# Patient Record
Sex: FEMALE | Race: WHITE
[De-identification: names, ages, dates, MRNs, and addresses within clinical notes are randomized per-mention and may not be internally consistent; named-entity substitution may affect disease eponyms.]

---

## 2019-02-18 ENCOUNTER — HOSPITAL ENCOUNTER (OUTPATIENT)
Dept: HOSPITAL 25 - ED | Age: 44
Setting detail: OBSERVATION
LOS: 1 days | Discharge: HOME | End: 2019-02-19
Attending: INTERNAL MEDICINE | Admitting: HOSPITALIST
Payer: COMMERCIAL

## 2019-02-18 ENCOUNTER — HOSPITAL ENCOUNTER (EMERGENCY)
Dept: HOSPITAL 25 - UCEAST | Age: 44
Discharge: ANOTHER HOSPITAL | End: 2019-02-18
Payer: COMMERCIAL

## 2019-02-18 VITALS — DIASTOLIC BLOOD PRESSURE: 83 MMHG | SYSTOLIC BLOOD PRESSURE: 107 MMHG

## 2019-02-18 DIAGNOSIS — R00.2: ICD-10-CM

## 2019-02-18 DIAGNOSIS — Z82.49: ICD-10-CM

## 2019-02-18 DIAGNOSIS — I47.1: Primary | ICD-10-CM

## 2019-02-18 DIAGNOSIS — Z87.891: ICD-10-CM

## 2019-02-18 DIAGNOSIS — R07.9: ICD-10-CM

## 2019-02-18 DIAGNOSIS — R79.89: ICD-10-CM

## 2019-02-18 DIAGNOSIS — Z79.82: ICD-10-CM

## 2019-02-18 LAB
ALBUMIN SERPL BCG-MCNC: 4.1 G/DL (ref 3.2–5.2)
ALBUMIN/GLOB SERPL: 1.2 {RATIO} (ref 1–3)
ALP SERPL-CCNC: 72 U/L (ref 34–104)
ALT SERPL W P-5'-P-CCNC: 14 U/L (ref 7–52)
ANION GAP SERPL CALC-SCNC: 3 MMOL/L (ref 2–11)
APTT PPP: 35.4 SECONDS (ref 26–36.3)
AST SERPL-CCNC: 18 U/L (ref 13–39)
BASOPHILS # BLD AUTO: 0.1 10^3/UL (ref 0–0.2)
BUN SERPL-MCNC: 10 MG/DL (ref 6–24)
BUN/CREAT SERPL: 12.8 (ref 8–20)
CALCIUM SERPL-MCNC: 8.6 MG/DL (ref 8.6–10.3)
CHLORIDE SERPL-SCNC: 109 MMOL/L (ref 101–111)
CK MB SERPL-MCNC: 2.6 NG/ML (ref 0.6–6.3)
EOSINOPHIL # BLD AUTO: 0.2 10^3/UL (ref 0–0.6)
GLOBULIN SER CALC-MCNC: 3.3 G/DL (ref 2–4)
GLUCOSE SERPL-MCNC: 85 MG/DL (ref 70–100)
HCO3 SERPL-SCNC: 29 MMOL/L (ref 22–32)
HCT VFR BLD AUTO: 44 % (ref 35–47)
HGB BLD-MCNC: 14.9 G/DL (ref 12–16)
INR PPP/BLD: 0.86 (ref 0.77–1.02)
LYMPHOCYTES # BLD AUTO: 3.3 10^3/UL (ref 1–4.8)
MAGNESIUM SERPL-MCNC: 2 MG/DL (ref 1.9–2.7)
MCH RBC QN AUTO: 32 PG (ref 27–31)
MCHC RBC AUTO-ENTMCNC: 34 G/DL (ref 31–36)
MCV RBC AUTO: 93 FL (ref 80–97)
MONOCYTES # BLD AUTO: 0.8 10^3/UL (ref 0–0.8)
NEUTROPHILS # BLD AUTO: 6.1 10^3/UL (ref 1.5–7.7)
NRBC # BLD AUTO: 0 10^3/UL
NRBC BLD QL AUTO: 0
PLATELET # BLD AUTO: 315 10^3/UL (ref 150–450)
POTASSIUM SERPL-SCNC: 3.8 MMOL/L (ref 3.5–5)
PROT SERPL-MCNC: 7.4 G/DL (ref 6.4–8.9)
RBC # BLD AUTO: 4.72 10^6/UL (ref 4–5.4)
SODIUM SERPL-SCNC: 141 MMOL/L (ref 135–145)
TROPONIN I SERPL-MCNC: 0.07 NG/ML (ref ?–0.04)
WBC # BLD AUTO: 10.6 10^3/UL (ref 3.5–10.8)

## 2019-02-18 PROCEDURE — 85025 COMPLETE CBC W/AUTO DIFF WBC: CPT

## 2019-02-18 PROCEDURE — 80061 LIPID PANEL: CPT

## 2019-02-18 PROCEDURE — 99212 OFFICE O/P EST SF 10 MIN: CPT

## 2019-02-18 PROCEDURE — 96372 THER/PROPH/DIAG INJ SC/IM: CPT

## 2019-02-18 PROCEDURE — 96361 HYDRATE IV INFUSION ADD-ON: CPT

## 2019-02-18 PROCEDURE — 96375 TX/PRO/DX INJ NEW DRUG ADDON: CPT

## 2019-02-18 PROCEDURE — G0463 HOSPITAL OUTPT CLINIC VISIT: HCPCS

## 2019-02-18 PROCEDURE — 93306 TTE W/DOPPLER COMPLETE: CPT

## 2019-02-18 PROCEDURE — 99284 EMERGENCY DEPT VISIT MOD MDM: CPT

## 2019-02-18 PROCEDURE — 84484 ASSAY OF TROPONIN QUANT: CPT

## 2019-02-18 PROCEDURE — 71046 X-RAY EXAM CHEST 2 VIEWS: CPT

## 2019-02-18 PROCEDURE — 82553 CREATINE MB FRACTION: CPT

## 2019-02-18 PROCEDURE — 36415 COLL VENOUS BLD VENIPUNCTURE: CPT

## 2019-02-18 PROCEDURE — 78452 HT MUSCLE IMAGE SPECT MULT: CPT

## 2019-02-18 PROCEDURE — 85730 THROMBOPLASTIN TIME PARTIAL: CPT

## 2019-02-18 PROCEDURE — G0378 HOSPITAL OBSERVATION PER HR: HCPCS

## 2019-02-18 PROCEDURE — 93005 ELECTROCARDIOGRAM TRACING: CPT

## 2019-02-18 PROCEDURE — 83735 ASSAY OF MAGNESIUM: CPT

## 2019-02-18 PROCEDURE — 85610 PROTHROMBIN TIME: CPT

## 2019-02-18 PROCEDURE — 83036 HEMOGLOBIN GLYCOSYLATED A1C: CPT

## 2019-02-18 PROCEDURE — A9502 TC99M TETROFOSMIN: HCPCS

## 2019-02-18 PROCEDURE — 93017 CV STRESS TEST TRACING ONLY: CPT

## 2019-02-18 PROCEDURE — 96374 THER/PROPH/DIAG INJ IV PUSH: CPT

## 2019-02-18 PROCEDURE — 80053 COMPREHEN METABOLIC PANEL: CPT

## 2019-02-18 PROCEDURE — 86140 C-REACTIVE PROTEIN: CPT

## 2019-02-18 RX ADMIN — HEPARIN SODIUM SCH UNITS: 5000 INJECTION INTRAVENOUS; SUBCUTANEOUS at 21:46

## 2019-02-18 RX ADMIN — METOPROLOL TARTRATE SCH: 25 TABLET, FILM COATED ORAL at 21:15

## 2019-02-18 NOTE — HP
CC:  ADAM Drake *

 

HISTORY AND PHYSICAL:

 

DATE OF ADMISSION:  02/18/19

 

PRIMARY CARE PROVIDER:  ADAM Drake.

 

ATTENDING PHYSICIAN:  Dr. Gaetano Kendall * (dictated by Christos Bryan NP).

 

CHIEF COMPLAINT:  Palpitations, chest heaviness.

 

HISTORY OF PRESENT ILLNESS:  Ms. Wu is a 43-year-old female with past 
medical history significant for SVT which she was using methamphetamines, who 
states that she was in her usual state of health and while cleaning earlier 
today, she felt as though her heart rate was elevated. At the same time, she 
was having palpitations and chest heaviness.  She decided to present to urgent 
care where she was found to have a heart rate in the 200s to 220s and be in 
SVT.  She denies any recent fevers, chills, cough, shortness of breath, 
although she states that she feels as though she is unable to take a full 
breath.  Nausea, vomiting, diarrhea, abdominal pain, urinary symptoms such as 
urgency, dysuria and frequency.  She states she has never had a stress test 
from urgent care.  EMS was called for her to be transferred to the emergency 
room.  EMS administered 6 mg of adenosine and her heart rate decreased into the 
100s.

 

While in the emergency room, she received a full dose aspirin, Lopressor 2.5 mg 
IV. She had an initial troponin of 0.07, an EKG showing a sinus rhythm with a 
rate of 92, a T-wave inversion in V1, ST depression in V3, 4, and 5.  She has 
no previous EKGs for comparison.  Her other labs were unremarkable.  She had a 
negative chest x- ray.  Due to her elevated troponin, she was referred to the 
hospitalist service for further evaluation and possible admission.

 

PAST MEDICAL HISTORY:

1.  Supraventricular tachycardia.

2.  Methamphetamine abuse.

 

PAST SURGICAL HISTORY:  Status post hysterectomy.

 

HOME MEDICATIONS:  Include:

1.  Multivitamin 1 tablet oral daily.

2.  Vitamin D3 5000 units oral daily.

3.  Zoloft 100 mg oral daily.

4.  Seroquel 12.5 mg oral daily.

5.  Vitamin B12 1000 mcg oral daily.

 

ALLERGIES:  No known drug allergies.

 

FAMILY HISTORY:  Mother with heart disease in her 60s.  Father with heart 
disease starting in his 50s.  Mother and father both with diabetes.  Father 
with colon cancer and mother with lung cancer.

 

SOCIAL HISTORY:  She is a former smoker quitting 6 years ago.  Prior to that 
she had a 20-year, 2 pack a day smoking history.  She denies alcohol use.  She 
has been sober from methamphetamine use for 6 years.  She is a private duty 
aide.  She has nobody that she would like to elect to be a surrogate decision 
maker for her.

 

REVIEW OF SYSTEMS:  I performed an 11-point review of systems.  All the 
pertinent positives and negatives are mentioned in the history of present 
illness.  The remaining review of systems is negative.

 

                               PHYSICAL EXAMINATION

 

GENERAL APPEARANCE:  She is alert, pleasant, appears to be in no acute distress.

 

VITAL SIGNS:  Temperature 98.1, heart rate 85, respiratory rate 15, O2 sat 96% 
on room air, blood pressure 91/68.

 

HEENT:  Normocephalic, atraumatic.  Pupils are equal and reactive to light. 
Extraocular movements are intact.

 

RESPIRATORY:  There is no accessory muscle use.  Lungs are clear to 
auscultation bilateral.

 

CARDIOVASCULAR:  Regular rate and rhythm.  S1, S2 present.  There are no murmurs
, rubs, or gallops heard.

 

ABDOMEN:  Soft, nontender, nondistended.  Bowel sounds present x4.

 

EXTREMITIES:  No lower extremity edema.  DP and PT pulses are 2+ and symmetric.

 

MUSCULOSKELETAL:  There is no clubbing or cyanosis noted.  The patient exhibits 
good strength in all extremities.

 

NEUROLOGICAL:  Alert and oriented x4.  Cranial nerves II through XII are 
grossly intact.

 

PSYCHOLOGICAL:  She is calm and cooperative.

 

SKIN:  No rashes or abnormalities seen in the exposed skin.

 

 DIAGNOSTIC STUDIES/LAB DATA:  Sodium 141, potassium 3.8, chloride 109, CO2 of 
29, BUN 10, creatinine 0.78, glucose 85.  White blood cell count 10.6, 
hemoglobin 14.9, hematocrit 44, platelet count 315,000.  Troponin 0.07.

 

EKG shows a sinus rhythm with a rate of 92.  There is T wave inversion in V1 
and ST depression in V3, 4 and 5.  There are no previous EKGs for comparison.

 

Chest x-ray from today.  Radiologist's impression:  No evidence of active 
cardiopulmonary disease.

 

IMPRESSION:  Ms. Wu is a 43-year-old female with past medical history 
significant for supraventricular tachycardia in the setting of methamphetamine 
use. She presented to urgent care initially with complaints of palpitations and 
was found to be in  supraventricular tachycardia with heart rates into the 220s 
and then was transferred to the emergency room after receiving adenosine.  She 
will be admitted as an observation for chest pain, elevated troponin and 
supraventricular tachycardia.

 

ASSESSMENT/PLAN:

1.  Chest pain.  The patient reports a sensation of chest heaviness, inability 
to take a deep breath and feeling as though she cannot take a deep breath.  She 
states that this is feeling better, but she reports feeling "top heavy."  I am 
going to trend her troponins q.3 hours, monitor her on telemetry with serial 
EKGs.  Plan is to get an echocardiogram in the morning and if her troponin does 
not continue to rise, get an exercise nuclear stress test.  She receives IV 
metoprolol in the ER. I am going to continue her on metoprolol tartrate 12.5 
twice daily.  She has already received full dose aspirin.  Continue her on a 
baby aspirin starting tomorrow.  Additionally, I am going to check fasting 
lipids and start a statin accordingly.  Also check a hemoglobin A1c.

2.  Supraventricular tachycardia.  Unclear cause.  In the past, she has had 
supraventricular tachycardia in the setting of methamphetamine use.  Her 
supraventricular tachycardia has broke.  She is now in sinus rhythm.  We will 
check magnesium.  Her potassium is within limits.  Again, she is going to get 
an echocardiogram and a stress test tomorrow.

3.  Fluids, electrolytes and nutrition:  Heart healthy diet.

4.  Code status.  Full code.

5.  DVT prophylaxis.  She is at moderate risk, will have subcu heparin.

6.  Disposition.  Observation.

 

TIME SPENT:  Time for this admission was approximately 60 minutes, greater than 
half of that was spent with the patient discussing medications, past medical 
history, the events leading up to her arrival today, performing a physical 
examination.

 

The case has been reviewed with the attending, Dr. Kendall, who agrees with the 
plan of care.

 

 ____________________________________ CHRISTOS BRYAN, EUSEBIO

 

[id number]/CPS #: [id number]

GABI

## 2019-02-18 NOTE — ED
Palpitations / Dysrhythmia





- HPI Summary


HPI Summary: 


43 year old female presents with palpitations for past couple hours. she has 

history of a fib a couple years ago which she was admitted for. palpitations 

started out of nowhere.  she states that she felt her heart racing and that she 

clean a house prior to coming here.  She denies any chest pain but states she 

can only take shallow breaths.  no history of SVT.  is stable at the moment and 

able to hold a full conversation. no pain or swelling in her calf muscles. no 

drug use. has history of depression. no recent illness.








- History of Current Complaint


Time Seen by Provider: 02/18/19 14:05





- Allergy/Home Medications


Allergies/Adverse Reactions: 


 Allergies











Allergy/AdvReac Type Severity Reaction Status Date / Time


 


No Known Allergies Allergy   Verified 02/18/19 14:19











Home Medications: 


 Home Medications





QUEtiapine TAB* [Seroquel 25 MG TAB*] 12.5 mg PO DAILY 02/18/19 [History 

Confirmed 02/18/19]


Sertraline* [Zoloft*] 100 mg PO DAILY 02/18/19 [History Confirmed 02/18/19]











PMH/Surg Hx/FS Hx/Imm Hx


Endocrine/Hematology History: 


   Denies: Hx Anticoagulant Therapy


Cardiovascular History: Reports: Hx Atrial Fibrillation


Infectious Disease History: 


   Denies: Traveled Outside the [place] in Last 30 Days





- Family History


Known Family History: Positive: Cardiac Disease





- Social History


Alcohol Use: None


Substance Use Type: Reports: None


Smoking Status (MU): Never Smoked Tobacco





Review of Systems


Negative: Fever


Positive: Palpitations.  Negative: Chest Pain


Negative: Shortness Of Breath


All Other Systems Reviewed And Are Negative: Yes





Physical Exam


Triage Information Reviewed: Yes


Vital Signs Reviewed: Yes


Appearance: Positive: Well-Appearing


Skin: Positive: Warm, Dry


Head/Face: Positive: Normal Head/Face Inspection


Eyes: Positive: Normal, Conjunctiva Clear


ENT: Positive: Pharynx normal


Respiratory/Lung Sounds: Positive: Clear to Auscultation, Breath Sounds Present


Cardiovascular: Positive: Tachycardia


Abdomen Description: Positive: Nontender, Soft


Bowel Sounds: Positive: Present


Musculoskeletal: Positive: Normal


Neurological: Positive: Normal


Psychiatric: Positive: Normal





Diagnostics





- Laboratory


Lab Statement: Any lab studies that have been ordered have been reviewed, and 

results considered in the medical decision making process.





- EKG


  ** No standard instances


Cardiac Rate: Tachycardia


EKG Rhythm: SVT


Summary of EKG Findings: svt





Course/Dx





- Course


Course Of Treatment: 43 year old female presents with paliptations today.  has 

history of a fib. on arrival placed on monitor heart rate 216. ekg shows SVT. 

attempted vagal manauvers and unsuccessful. bp is 120/60. only medication here 

is diltiazem so with patient stable will transfer patient for further work up 

in ED and management of arrhythmia. spoke with tommy in the ED to transfer 

patient.





- Diagnoses


Differential Diagnosis/HQI/PQRI: Positive: Panic Disorder, Paroxymal SVT, Other 

- a fib


Provider Diagnoses: 


 SVT (supraventricular tachycardia)








Discharge





- Sign-Out/Discharge


Documenting (check all that apply): Patient Departure


All imaging exams completed and their final reports reviewed: No Studies





- Discharge Plan


Condition: Stable


Disposition: TRANS HIGHER LVL OF CARE FAC


Referrals: 


Hina Mcadams PA [Primary Care Provider] - 





- Billing Disposition and Condition


Condition: STABLE


Disposition: Trans Higher Lvl of Care Fac

## 2019-02-18 NOTE — ED
Palpitations / Dysrhythmia





- HPI Summary


HPI Summary: 





Patient is a 43-year-old female with a 2 time history of SVT and one time 

history of A. fib presenting to the ED from urgent care with SVT.  She states 

the last 2 times she has had SVT she was a meth user.  Currently clean 6 

years.  She denies taking any medications.  Denies any allergies.  She states 

she is otherwise healthy.  EMS was able to convert back to sinus rhythm with 6 

of adenosine.  She arrives with fluids.  Patient endorses fatigue, but denies 

any CP or SOB.  She states her symptoms began with feeling palpitations and 

feeling like her heart was racing.  She ignores this feeling in continued to go 

about her daily activities.  She states eventually it got to the point where 

she felt she was too fatigued to continue during her normal activities and went 

to urgent care.





- History of Current Complaint


Chief Complaint: EDDysrhythmPalp


Time Seen by Provider: 02/18/19 15:02


Hx Obtained From: Patient


Onset/Duration: Sudden Onset


Timing: Constant


Severity Initially: Mild


Severity Currently: Mild


Associated Signs & Symptoms: Negative





- Allergy/Home Medications


Allergies/Adverse Reactions: 


 Allergies











Allergy/AdvReac Type Severity Reaction Status Date / Time


 


No Known Allergies Allergy   Verified 02/18/19 15:14











Home Medications: 


 Home Medications





Cholecalciferol (Vitamin D3) [Natural Vitamin D-3] 5,000 unit PO DAILY 02/18/19 

[History Confirmed 02/18/19]


Cyanocobalamin TAB* [Vitamin B12 TAB*] 1,000 mcg PO DAILY 02/18/19 [History 

Confirmed 02/18/19]


Multivitamin [Multivitamins] 1 each PO DAILY 02/18/19 [History Confirmed 02/18/ 19]











PMH/Surg Hx/FS Hx/Imm Hx


Previously Healthy: Yes


Endocrine/Hematology History: 


   Denies: Hx Anticoagulant Therapy


Cardiovascular History: Reports: Hx Atrial Fibrillation





- Immunization History


Hx Pertussis Vaccination: No


Immunizations Up to Date: Yes


Infectious Disease History: No


Infectious Disease History: 


   Denies: Traveled Outside the [place] in Last 30 Days





- Family History


Known Family History: Positive: Cardiac Disease





- Social History


Occupation: Employed Full-time


Lives: With Family


Alcohol Use: None


Hx Substance Use: No


Substance Use Type: Reports: None


Hx Tobacco Use: No - were


Smoking Status (MU): Former Smoker





Review of Systems


Constitutional: Negative


Negative: Fever, Chills, Fatigue, Skin Diaphoresis


Negative: Dental Pain, Sore Throat


Positive: Palpitations.  Negative: Chest Pain


Negative: Shortness Of Breath, Cough


Negative: Abdominal Pain, Vomiting


Genitourinary: Negative


Positive: no symptoms reported, see HPI


Negative: Headache, Weakness, Paresthesia


Psychological: Normal


All Other Systems Reviewed And Are Negative: Yes





Physical Exam


Triage Information Reviewed: Yes


Vital Signs On Initial Exam: 


 Initial Vitals











Pulse Resp BP Pulse Ox


 


 102   18   112/78   97 


 


 02/18/19 15:06  02/18/19 15:06  02/18/19 15:06  02/18/19 15:06











Vital Signs Reviewed: Yes


Appearance: Positive: Well-Appearing, Well-Nourished


Skin: Positive: Warm, Skin Color Reflects Adequate Perfusion


Head/Face: Positive: Normal Head/Face Inspection


Eyes: Positive: EOMI, RUY, Conjunctiva Clear


Neck: Positive: Supple, Nontender, No Lymphadenopathy


Respiratory/Lung Sounds: Positive: Clear to Auscultation, Breath Sounds Present


Cardiovascular: Positive: RRR, Pulses are Symmetrical in both Upper and Lower 

Extremities


Musculoskeletal: Positive: Normal, Strength/ROM Intact


Neurological: Positive: Sensory/Motor Intact, Alert, Oriented to Person Place, 

Time, Speech Normal





Diagnostics





- Vital Signs


 Vital Signs











  Temp Pulse Resp BP Pulse Ox


 


 02/18/19 16:06   94  16  104/62  97


 


 02/18/19 16:00   96  21   98


 


 02/18/19 15:36     106/67 


 


 02/18/19 15:09  98.1 F  104  21  112/78  96


 


 02/18/19 15:06   102  18  112/78  97














- Laboratory


Lab Results: 


 Lab Results











  02/18/19 02/18/19 Range/Units





  15:30 15:30 


 


WBC  10.6   (3.5-10.8)  10^3/ul


 


RBC  4.72   (4.00-5.40)  10^6/ul


 


Hgb  14.9   (12.0-16.0)  g/dl


 


Hct  44   (35-47)  %


 


MCV  93   (80-97)  fL


 


MCH  32 H   (27-31)  pg


 


MCHC  34   (31-36)  g/dl


 


RDW  13   (10.5-15)  %


 


Plt Count  315   (150-450)  10^3/ul


 


MPV  7.8   (7.4-10.4)  fL


 


Neut % (Auto)  57.9   %


 


Lymph % (Auto)  31.4   %


 


Mono % (Auto)  7.7   %


 


Eos % (Auto)  2.2   %


 


Baso % (Auto)  0.8   %


 


Absolute Neuts (auto)  6.1   (1.5-7.7)  10^3/ul


 


Absolute Lymphs (auto)  3.3   (1.0-4.8)  10^3/ul


 


Absolute Monos (auto)  0.8   (0-0.8)  10^3/ul


 


Absolute Eos (auto)  0.2   (0-0.6)  10^3/ul


 


Absolute Basos (auto)  0.1   (0-0.2)  10^3/ul


 


Absolute Nucleated RBC  0   10^3/ul


 


Nucleated RBC %  0   


 


Sodium   141  (135-145)  mmol/L


 


Potassium   3.8  (3.5-5.0)  mmol/L


 


Chloride   109  (101-111)  mmol/L


 


Carbon Dioxide   29  (22-32)  mmol/L


 


Anion Gap   3  (2-11)  mmol/L


 


BUN   10  (6-24)  mg/dL


 


Creatinine   0.78  (0.51-0.95)  mg/dL


 


Est GFR ( Amer)   97.5  (>60)  


 


Est GFR (Non-Af Amer)   80.6  (>60)  


 


BUN/Creatinine Ratio   12.8  (8-20)  


 


Glucose   85  ()  mg/dL


 


Calcium   8.6  (8.6-10.3)  mg/dL


 


Total Bilirubin   0.30  (0.2-1.0)  mg/dL


 


AST   18  (13-39)  U/L


 


ALT   14  (7-52)  U/L


 


Alkaline Phosphatase   72  ()  U/L


 


CK-MB (CK-2)   2.6  (0.6-6.3)  ng/mL


 


Troponin I   0.07 H*  (<0.04)  ng/mL


 


C-Reactive Protein   1.67  (<8.01)  mg/L


 


Total Protein   7.4  (6.4-8.9)  g/dL


 


Albumin   4.1  (3.2-5.2)  g/dL


 


Globulin   3.3  (2-4)  g/dL


 


Albumin/Globulin Ratio   1.2  (1-3)  











Result Diagrams: 


 02/18/19 15:30





 02/18/19 15:30


Lab Statement: Any lab studies that have been ordered have been reviewed, and 

results considered in the medical decision making process.





Course/Dx





- Course


Course Of Treatment: Patient arrives by way of EMS in normal sinus rhythm.  EKG 

obtained which shows a rate of 92 with ST depressions throughout.  Troponin 

0.07.  Patient is given metoprolol 2.5 IV and aspirin 324.  Patient is to feel 

fatigued, however denies any CP or SOB.  Second EKG obtained which shows a rate 

of 89.  Discussed case with Dr. Peña.  Discussed case with Dr. Stearns, 

hospitalist who agrees to admit and likely will have echo in the morning.  

Patient made aware and is okay with this plan.





- Diagnoses


Provider Diagnoses: 


 SVT (supraventricular tachycardia)








- Physician Notifications


Discussed Care Of Patient With: Luis Antonio Peña





Discharge





- Sign-Out/Discharge


Documenting (check all that apply): Patient Departure


Patient Received Moderate/Deep Sedation with Procedure: No





- Discharge Plan


Condition: Fair


Disposition: ADMITTED TO [facility]


Referrals: 


Hina Mcadams PA [Primary Care Provider] - 





- Billing Disposition and Condition


Condition: FAIR


Disposition: Admitted to [facility]

## 2019-02-19 VITALS — SYSTOLIC BLOOD PRESSURE: 99 MMHG | DIASTOLIC BLOOD PRESSURE: 60 MMHG

## 2019-02-19 LAB
CHOLEST SERPL-MCNC: 164 MG/DL
HDLC SERPL-MCNC: 51.9 MG/DL
TRIGL SERPL-MCNC: 58 MG/DL

## 2019-02-19 RX ADMIN — HEPARIN SODIUM SCH UNITS: 5000 INJECTION INTRAVENOUS; SUBCUTANEOUS at 05:46

## 2019-02-19 RX ADMIN — METOPROLOL TARTRATE SCH: 25 TABLET, FILM COATED ORAL at 09:43

## 2019-02-19 RX ADMIN — HEPARIN SODIUM SCH UNITS: 5000 INJECTION INTRAVENOUS; SUBCUTANEOUS at 13:56

## 2019-02-19 NOTE — DS
DISCHARGE SUMMARY:

 

DATE OF ADMISSION:  02/18/19

 

DATE OF DISCHARGE:  02/19/19

 

PRIMARY CARE PROVIDER:  Hina Mcadams, Fax [phone]

 

PRIMARY DIAGNOSES:

1.  Supraventricular tachycardia.

2.  Elevated troponin.

 

MEDICATIONS ON DISCHARGE:  Include:

1.  Aspirin 81 mg daily.

2.  Vitamin B12 1000 mcg daily.

3.  Seroquel 12.5 mg daily.

4.  Zoloft 100 mg daily.

5.  Cholecalciferol 5000 units daily.

6.  Multivitamin 1 tablet daily.

 

Note:  Metoprolol was not added secondary to borderline-low blood pressure.

 

PROCEDURE PERFORMED DURING HOSPITAL STAY:

1.  Myoview stress test.  Impression:  Low risk.

2.  Transthoracic echocardiogram.  Impression:  Left ventricular systolic 
function is within normal limits.  LVEF is 50% to 55%.  Right ventricular 
function is normal.  Trace AR, no evidence of AS.  Trace MR, no evidence of MS.
  Trace MR, no pulmonary hypertension.  No pericardial effusion.

 

PERTINENT LABORATORY DATA:  Troponin I on presentation peaked at 2.34 before 
downtrending.

 

Pertinent vitals on presentation:  Heart rate peaked at 104, although it was 
204 in the computer, but 210 by EKG.

 

HISTORY OF PRESENT ILLNESS AND HOSPITAL COURSE:  This is a 43-year-old female 
with past medical history of methamphetamine abuse, complicated by 
supraventricular tachycardia, has been sober for 6 years, currently quite active
, worked several jobs including cleaning houses without any complications 
including chest pain or shortness of breath, but in the usual state of health; 
however, noticed while she was cleaning had some palpitation as well as chest 
tightness, like it was difficult to take a deep breath, presented to urgent care
, was found with a heart rate of 220 beats per minute.  She was given adenosine 
as well as Lopressor and heart rate broke to normal sinus rhythm.  She had an 
elevated troponin after extreme tachycardia, which downtrended after control of 
heart rate.  Her stress test was negative.  She remained in normal sinus rhythm 
throughout the remainder of the hospital stay.  Her blood pressure per report 
runs in the systolics of 90 and that is where it ran during majority of this 
hospital stay and was limited with diastolics in the 50s to 60s.  This limited 
the initiation of metoprolol for what she received 0 doses during the course of 
hospital stay after admission.  Attention to initiating AV brady blockade 
should be made.

 

I did discuss with Dr. Mancera, who recommends that she be seen in consultation 
with Dr. Isaac, whose name I placed in her discharge paper work, discussed 
with the patient as well as placed a consultation in [facility].  I am not clear 
that this will transfer to [facility], but he does come to [place] once monthly.

 

There were no complications during the course of her hospital stay.

 

At followup:

1.  Evaluate blood pressure and initiate low dose AV brady blockade if possible.

2.  Please ensure the patient is able to follow up with electrophysiologist 
including possibility of Dr. Beto Isaac, who does come to [place] monthly as 
recommended by Dr. Mancera.

3.  No other specific labs or vitals that need followup.

 

Reasons to return to the hospital including, but not limited to recurrent or 
worsening symptoms, palpitation, shortness of breath, chest pain, nausea, 
vomiting, lightheadedness, loss of consciousness or near loss of consciousness, 
dyspnea on exertion, inability to obtain or tolerate medications discussed with 
the patient, she acknowledged understanding.

 

TIME SPENT:  Greater than 60 minutes was spent on the discharge of this patient
, greater than half was spent face-to-face with the patient.

 

 [id number]/CPS #: [id number]

MTDD

## 2019-02-19 NOTE — ECHO
Patient:      THAI ACOSTA  

Med Rec#:     [id number]            :          1975          

Date:         2019            Age:          43y                 

Account#:     [id number]          Height:       163 cm / 64.2 in

Accession#:   [id number]           Weight:       73 kg / 160.9 lbs

Sex:          F                     BSA:          1.79

Room#:        [facility]                 

Admit Date#:  2019          

Type:         Inpatient

 

Referring:    Racquel Schulz NP

Reading:      Luis Antonio Peña MD

Sonographer:  Racquel Talley RD

______________________________________________________________________

 

Transthoracic Echocardiogram

 

Indication:

Chest pain, SVT

BP:           101/53

HR:           62

Rhythm:       NSR

 

Findings     

History:

Palpitations PTA, methamphetamine abuse. 

 

Technical Comments:

The study quality is good.  Completed at 0840. 

 

Left Ventricle:

The left ventricular chamber size is normal. There is no left

ventricular hypertrophy.   Global left ventricular wall motion and

contractility are within normal limits. Left ventricular systolic

function is at the lower limits of normal.  The estimated ejection

fraction is 50-55%.  Normal left ventricular diastolic filling is

observed. 

 

Left Atrium:

The left atrial chamber size is normal. 

 

Right Ventricle:

Moderator Band present. The right ventricular cavity size is normal. The

right ventricular global systolic function is normal. 

 

Right Atrium:

The right atrium is mildly dilated.  

 

Aortic Valve:

The aortic valve is trileaflet. There is no evidence of aortic valve

thickening. There is a trace of aortic regurgitation. There is no

evidence of aortic stenosis. 

 

Mitral Valve:

The mitral valve leaflets are mildly thickened. There is a trace of

mitral regurgitation. There is no evidence of mitral stenosis. 

 

Tricuspid Valve:

The tricuspid valve leaflets are normal.  There is trace tricuspid

regurgitation.  The right ventricular systolic pressure is estimated at

16 mmHg.  No pulmonary hypertension is noted. There is no tricuspid

stenosis. 

 

Pulmonic Valve:

The pulmonic valve appears normal. There is a trace pulmonic

regurgitation.  There is no pulmonic stenosis.  

 

Pericardium:

There is no significant pericardial effusion. 

 

Aorta:

There is no dilatation of the ascending aorta. There is no dilatation of

the aortic arch. The aortic root is normal in size. 

 

Pulmonary Artery:

The main pulmonary artery appears normal. 

 

Venous:

The inferior vena cava appears normal in size. There is a greater than

50% respiratory change in the inferior vena cava dimension. 

 

Conclusions

Global left ventricular wall motion and contractility are within normal

limits.

Left ventricular systolic function is at the lower limits of normal. 

The estimated ejection fraction is 50-55%. 

The right ventricular global systolic function is normal.

There is a trace of aortic regurgitation.

There is no evidence of aortic stenosis.

There is a trace of mitral regurgitation.

There is no evidence of mitral stenosis.

There is trace tricuspid regurgitation. 

No pulmonary hypertension is noted.

There is no significant pericardial effusion.

 

Measurements     

Name                    Value         Normal Range            

RVIDd (AP) 2D           2.4 cm        (0.9 - 2.6)             

RVDdMajor (2D)          3.5 cm        (2.2 - 4.4)             

RAd ISD 4CH             5.4 cm        (3.4 - 4.9)             

RA (A4C)W               4 cm          (2.9 - 4.6)             

IVSd (2D)               0.8 cm        (0.6 - 1)               

LVPWd (2D)              0.8 cm        (0.6 - 1)               

LVIDd (2D)              4.8 cm        (3.6 - 5.4)             

LVIDs (2D)              3.5 cm        -                        

LV FS (2D)              27 %          (25 - 45)               

Aortic Annulus          1.9 cm        (1.4 - 2.6)             

Ao root diameter (2D)   2.6 cm        (2.1 - 3.5)             

Ascending Ao            2.6 cm        (2.1 - 3.4)             

Aortic arch             2 cm          (1.8 - 3.4)             

LA dimension (AP) 2D    3.4 cm        (2.3 - 3.8)             

LAd ISD 4CH             5 cm          (2.9 - 5.3)             

LA ISD 4CH W            4.1 cm        (2.5 - 4.5)             

 

Name                    Value         Normal Range            

LA ESV BP (A/L) index   21 ml/m2      -                        

 

Name                    Value         Normal Range            

MV E-wave Vmax          0.8 m/sec     -                        

MV deceleration time    215 msec      -                        

MV A-wave Vmax          0.4 m/sec     -                        

MV E:A ratio            1.9 ratio     -                        

LV septal e' Vmax       0.11 m/sec    -                        

LV lateral e' Vmax      0.11 m/sec    -                        

LV E:e' septal ratio    7.3 ratio     -                        

LV E:e' lateral ratio   7.3 ratio     -                        

 

Name                    Value         Normal Range            

AV Vmax                 1.2 m/sec     -                        

AV VTI                  22 cm         -                        

AV peak gradient        6 mmHg        -                        

AV mean gradient        3 mmHg        -                        

LVOT Vmax               0.8 m/sec     -                        

LVOT VTI                17 cm         -                        

LVOT peak gradient      3 mmHg        -                        

LVOT mean gradient      1 mmHg        -                        

EMMY Vmax                1.1 m/sec     -                        

 

Name                    Value         Normal Range            

TR Vmax                 1.8 m/sec     -                        

TR peak gradient        13 mmHg       -                        

RAP                     3 mmHg        -                        

RVSP                    16 mmHg       -                        

IVC diameter            1.9 cm        -                        

 

Name                    Value         Normal Range            

PV Vmax                 0.6 m/sec     -                        

PV peak gradient        2 mmHg        -                        

 

Electronically signed by: Luis Antonio Peña MD on 2019 09:25:45